# Patient Record
Sex: MALE
[De-identification: names, ages, dates, MRNs, and addresses within clinical notes are randomized per-mention and may not be internally consistent; named-entity substitution may affect disease eponyms.]

---

## 2018-04-09 ENCOUNTER — HOSPITAL ENCOUNTER (OUTPATIENT)
Dept: HOSPITAL 92 - BICULT | Age: 58
Discharge: HOME | End: 2018-04-09
Attending: FAMILY MEDICINE
Payer: COMMERCIAL

## 2018-04-09 DIAGNOSIS — K80.20: ICD-10-CM

## 2018-04-09 DIAGNOSIS — R94.5: Primary | ICD-10-CM

## 2018-04-09 DIAGNOSIS — K76.0: ICD-10-CM

## 2018-04-09 PROCEDURE — 76700 US EXAM ABDOM COMPLETE: CPT

## 2019-11-15 ENCOUNTER — HOSPITAL ENCOUNTER (OUTPATIENT)
Dept: HOSPITAL 92 - BICMRI | Age: 59
Discharge: HOME | End: 2019-11-15
Attending: FAMILY MEDICINE
Payer: COMMERCIAL

## 2019-11-15 DIAGNOSIS — S46.911D: ICD-10-CM

## 2019-11-15 DIAGNOSIS — S46.211D: ICD-10-CM

## 2019-11-15 DIAGNOSIS — M19.011: ICD-10-CM

## 2019-11-15 DIAGNOSIS — M75.111: ICD-10-CM

## 2019-11-15 DIAGNOSIS — S49.91XD: Primary | ICD-10-CM

## 2019-11-15 NOTE — MRI
MR of the right shoulder without contrast



INDICATION: Right shoulder pain.



TECHNIQUE: Sagittal T1, axial and coronal PD fat sat, sagittal and coronal T2 fat sat images were obt
ained of the right shoulder.



COMPARISON: MR right shoulder dated August 1, 2012 and radiographs of the right shoulder dated Octobe
r 4, 2019



FINDINGS: Motion artifact limits image detail.



Rotator cuff: Since the comparison examination there is now complete tear involving the subscapularis
 tendon. There is a high-grade partial thickness articular surface tear involving the supraspinatus

with a near full-thickness tear component involving the conjoined tendon. There is prominent tendinos
is of the infraspinatus.



Glenohumeral joint: There is mild glenohumeral chondrosis



Glenoid labrum: There is degenerative fraying of the superior glenoid labrum



Biceps tendon and biceps anchor: There is complete disruption of the biceps tendon with distal retrac
tion.



Acromion clavicular joint: There is severe AC joint osteoarthrosis



Subacromial subdeltoid space: There is mild fluid in the subacromial subdeltoid bursa.



Axillary region: No lymphadenopathy.



Surrounding shoulder musculature: Normal.  No evidence of atrophy or strain.



IMPRESSION:

1. Interval full-thickness tear of the subscapularis and high-grade articular surface partial thickne
ss tear of the supraspinatus with a near full-thickness tear component involving the conjoined

tendon.

2. Complete tear of the long head of the biceps tendon with distal retraction.

3. Mild glenohumeral chondrosis

4. Severe AC joint osteoarthrosis



Reported By: Shaun Venegas 

Electronically Signed:  11/15/2019 4:08 PM